# Patient Record
Sex: FEMALE | Race: WHITE | Employment: UNEMPLOYED | URBAN - METROPOLITAN AREA
[De-identification: names, ages, dates, MRNs, and addresses within clinical notes are randomized per-mention and may not be internally consistent; named-entity substitution may affect disease eponyms.]

---

## 2017-01-03 RX ORDER — GABAPENTIN 100 MG/1
100 CAPSULE ORAL 3 TIMES DAILY
Qty: 90 CAPSULE | Refills: 0 | Status: SHIPPED | OUTPATIENT
Start: 2017-01-03 | End: 2017-05-25 | Stop reason: SDUPTHER

## 2018-03-13 ENCOUNTER — HOSPITAL ENCOUNTER (OUTPATIENT)
Age: 53
Setting detail: SPECIMEN
Discharge: HOME | End: 2018-03-13
Payer: COMMERCIAL

## 2018-03-13 ENCOUNTER — OFFICE VISIT (OUTPATIENT)
Dept: OBGYN CLINIC | Age: 53
End: 2018-03-13
Payer: COMMERCIAL

## 2018-03-13 VITALS
WEIGHT: 145 LBS | BODY MASS INDEX: 25.69 KG/M2 | DIASTOLIC BLOOD PRESSURE: 70 MMHG | HEART RATE: 68 BPM | HEIGHT: 63 IN | RESPIRATION RATE: 18 BRPM | SYSTOLIC BLOOD PRESSURE: 118 MMHG

## 2018-03-13 DIAGNOSIS — R63.5 WEIGHT GAIN: ICD-10-CM

## 2018-03-13 DIAGNOSIS — Z12.31 VISIT FOR SCREENING MAMMOGRAM: ICD-10-CM

## 2018-03-13 DIAGNOSIS — Z01.419 PAP SMEAR, AS PART OF ROUTINE GYNECOLOGICAL EXAMINATION: Primary | ICD-10-CM

## 2018-03-13 DIAGNOSIS — N92.6 IRREGULAR PERIODS: ICD-10-CM

## 2018-03-13 PROCEDURE — 99396 PREV VISIT EST AGE 40-64: CPT | Performed by: NURSE PRACTITIONER

## 2018-03-13 ASSESSMENT — ENCOUNTER SYMPTOMS
VOMITING: 0
CONSTIPATION: 0
TROUBLE SWALLOWING: 0
COLOR CHANGE: 0
BACK PAIN: 0
ABDOMINAL PAIN: 0
COUGH: 0
DIARRHEA: 0
SORE THROAT: 0
WHEEZING: 0
ABDOMINAL DISTENTION: 0
STRIDOR: 0
PHOTOPHOBIA: 0
NAUSEA: 0
SHORTNESS OF BREATH: 0

## 2018-03-15 LAB
HPV SAMPLE: NORMAL
HPV SOURCE: NORMAL
HPV, GENOTYPE 16: NOT DETECTED
HPV, GENOTYPE 18: NOT DETECTED
HPV, HIGH RISK OTHER: NOT DETECTED
HPV, INTERPRETATION: NORMAL

## 2018-03-22 ENCOUNTER — HOSPITAL ENCOUNTER (OUTPATIENT)
Dept: WOMENS IMAGING | Age: 53
Discharge: HOME | End: 2018-03-24
Payer: COMMERCIAL

## 2018-03-22 ENCOUNTER — TELEPHONE (OUTPATIENT)
Dept: OBGYN CLINIC | Age: 53
End: 2018-03-22

## 2018-03-22 ENCOUNTER — HOSPITAL ENCOUNTER (OUTPATIENT)
Age: 53
Discharge: HOME | End: 2018-03-22
Payer: COMMERCIAL

## 2018-03-22 DIAGNOSIS — N92.6 IRREGULAR PERIODS: ICD-10-CM

## 2018-03-22 DIAGNOSIS — Z12.31 VISIT FOR SCREENING MAMMOGRAM: ICD-10-CM

## 2018-03-22 DIAGNOSIS — R63.5 WEIGHT GAIN: ICD-10-CM

## 2018-03-22 LAB
ABSOLUTE EOS #: 0.16 K/UL (ref 0–0.4)
ABSOLUTE IMMATURE GRANULOCYTE: ABNORMAL K/UL (ref 0–0.3)
ABSOLUTE LYMPH #: 1.75 K/UL (ref 1–4.8)
ABSOLUTE MONO #: 0.58 K/UL (ref 0.1–1.3)
BASOPHILS # BLD: 1 % (ref 0–2)
BASOPHILS ABSOLUTE: 0.05 K/UL (ref 0–0.2)
CYTOLOGY REPORT: NORMAL
DIFFERENTIAL TYPE: ABNORMAL
EOSINOPHILS RELATIVE PERCENT: 3 % (ref 0–4)
FOLLICLE STIMULATING HORMONE: 23.4 U/L (ref 1.7–21.5)
GLUCOSE BLD-MCNC: 96 MG/DL (ref 70–99)
HCT VFR BLD CALC: 35.7 % (ref 36–46)
HEMOGLOBIN: 11.5 G/DL (ref 12–16)
IMMATURE GRANULOCYTES: ABNORMAL %
INSULIN COMMENT: NORMAL
INSULIN REFERENCE RANGE:: NORMAL
INSULIN: 8.4 MU/L
LYMPHOCYTES # BLD: 33 % (ref 24–44)
MCH RBC QN AUTO: 25.5 PG (ref 26–34)
MCHC RBC AUTO-ENTMCNC: 32.3 G/DL (ref 31–37)
MCV RBC AUTO: 79 FL (ref 80–100)
MONOCYTES # BLD: 11 % (ref 1–7)
MORPHOLOGY: ABNORMAL
NRBC AUTOMATED: ABNORMAL PER 100 WBC
PDW BLD-RTO: 16.9 % (ref 11.5–14.9)
PLATELET # BLD: 280 K/UL (ref 150–450)
PLATELET ESTIMATE: ABNORMAL
PMV BLD AUTO: 7.8 FL (ref 6–12)
RBC # BLD: 4.51 M/UL (ref 4–5.2)
RBC # BLD: ABNORMAL 10*6/UL
SEG NEUTROPHILS: 52 % (ref 36–66)
SEGMENTED NEUTROPHILS ABSOLUTE COUNT: 2.76 K/UL (ref 1.3–9.1)
TSH SERPL DL<=0.05 MIU/L-ACNC: 1.43 MIU/L (ref 0.3–5)
WBC # BLD: 5.3 K/UL (ref 3.5–11)
WBC # BLD: ABNORMAL 10*3/UL

## 2018-03-22 PROCEDURE — 83525 ASSAY OF INSULIN: CPT

## 2018-03-22 PROCEDURE — 77063 BREAST TOMOSYNTHESIS BI: CPT

## 2018-03-22 PROCEDURE — 84443 ASSAY THYROID STIM HORMONE: CPT

## 2018-03-22 PROCEDURE — 85025 COMPLETE CBC W/AUTO DIFF WBC: CPT

## 2018-03-22 PROCEDURE — 83001 ASSAY OF GONADOTROPIN (FSH): CPT

## 2018-03-22 PROCEDURE — 36415 COLL VENOUS BLD VENIPUNCTURE: CPT

## 2018-03-22 PROCEDURE — 82947 ASSAY GLUCOSE BLOOD QUANT: CPT

## 2019-08-06 ENCOUNTER — HOSPITAL ENCOUNTER (OUTPATIENT)
Dept: WOMENS IMAGING | Age: 54
Discharge: HOME | End: 2019-08-08
Payer: COMMERCIAL

## 2019-08-06 ENCOUNTER — HOSPITAL ENCOUNTER (OUTPATIENT)
Age: 54
Setting detail: SPECIMEN
Discharge: HOME | End: 2019-08-06
Payer: COMMERCIAL

## 2019-08-06 DIAGNOSIS — Z12.39 SCREENING BREAST EXAMINATION: ICD-10-CM

## 2019-08-06 LAB
ALBUMIN SERPL-MCNC: 4.6 G/DL (ref 3.5–5.2)
ALBUMIN/GLOBULIN RATIO: NORMAL (ref 1–2.5)
ALP BLD-CCNC: 68 U/L (ref 35–104)
ALT SERPL-CCNC: 12 U/L (ref 5–33)
ANION GAP SERPL CALCULATED.3IONS-SCNC: 10 MMOL/L (ref 9–17)
AST SERPL-CCNC: 21 U/L
BILIRUB SERPL-MCNC: 0.41 MG/DL (ref 0.3–1.2)
BUN BLDV-MCNC: 14 MG/DL (ref 6–20)
BUN/CREAT BLD: NORMAL (ref 9–20)
CALCIUM SERPL-MCNC: 9.7 MG/DL (ref 8.6–10.4)
CHLORIDE BLD-SCNC: 101 MMOL/L (ref 98–107)
CHOLESTEROL/HDL RATIO: 2
CHOLESTEROL: 180 MG/DL
CO2: 28 MMOL/L (ref 20–31)
CREAT SERPL-MCNC: 0.68 MG/DL (ref 0.5–0.9)
GFR AFRICAN AMERICAN: >60 ML/MIN
GFR NON-AFRICAN AMERICAN: >60 ML/MIN
GFR SERPL CREATININE-BSD FRML MDRD: NORMAL ML/MIN/{1.73_M2}
GFR SERPL CREATININE-BSD FRML MDRD: NORMAL ML/MIN/{1.73_M2}
GLUCOSE BLD-MCNC: 92 MG/DL (ref 70–99)
HDLC SERPL-MCNC: 91 MG/DL
LDL CHOLESTEROL: 80 MG/DL (ref 0–130)
POTASSIUM SERPL-SCNC: 4 MMOL/L (ref 3.7–5.3)
SEDIMENTATION RATE, ERYTHROCYTE: 5 MM (ref 0–20)
SODIUM BLD-SCNC: 139 MMOL/L (ref 135–144)
TOTAL PROTEIN: 7.5 G/DL (ref 6.4–8.3)
TRIGL SERPL-MCNC: 45 MG/DL
VLDLC SERPL CALC-MCNC: NORMAL MG/DL (ref 1–30)

## 2019-08-06 PROCEDURE — 86225 DNA ANTIBODY NATIVE: CPT

## 2019-08-06 PROCEDURE — 36415 COLL VENOUS BLD VENIPUNCTURE: CPT

## 2019-08-06 PROCEDURE — 82785 ASSAY OF IGE: CPT

## 2019-08-06 PROCEDURE — 86038 ANTINUCLEAR ANTIBODIES: CPT

## 2019-08-06 PROCEDURE — 80053 COMPREHEN METABOLIC PANEL: CPT

## 2019-08-06 PROCEDURE — 86431 RHEUMATOID FACTOR QUANT: CPT

## 2019-08-06 PROCEDURE — 77063 BREAST TOMOSYNTHESIS BI: CPT

## 2019-08-06 PROCEDURE — 86003 ALLG SPEC IGE CRUDE XTRC EA: CPT

## 2019-08-06 PROCEDURE — 80061 LIPID PANEL: CPT

## 2019-08-06 PROCEDURE — 85651 RBC SED RATE NONAUTOMATED: CPT

## 2019-08-06 PROCEDURE — 86235 NUCLEAR ANTIGEN ANTIBODY: CPT

## 2019-08-07 LAB
ALLERGEN BARLEY IGE: <0.34 KU/L (ref 0–0.34)
ALLERGEN BEEF: <0.34 KU/L (ref 0–0.34)
ALLERGEN CABBAGE IGE: <0.34 KU/L (ref 0–0.34)
ALLERGEN CARROT IGE: <0.34 KU/L (ref 0–0.34)
ALLERGEN CHICKEN IGE: <0.34 KU/L (ref 0–0.34)
ALLERGEN CODFISH IGE: <0.34 KU/L (ref 0–0.34)
ALLERGEN CORN IGE: <0.34 KU/L (ref 0–0.34)
ALLERGEN COW MILK IGE: <0.34 KU/L (ref 0–0.34)
ALLERGEN CRAB IGE: <0.34 KU/L (ref 0–0.34)
ALLERGEN EGG WHITE IGE: <0.34 KU/L (ref 0–0.34)
ALLERGEN GRAPE IGE: <0.34 KU/L (ref 0–0.34)
ALLERGEN LETTUCE IGE: <0.34 KU/L (ref 0–0.34)
ALLERGEN NAVY BEAN: <0.34 KU/L (ref 0–0.34)
ALLERGEN OAT: <0.34 KU/L (ref 0–0.34)
ALLERGEN ORANGE IGE: <0.34 KU/L (ref 0–0.34)
ALLERGEN PEANUT (F13) IGE: <0.34 KU/L (ref 0–0.34)
ALLERGEN PEPPER C. ANNUUM IGE: <0.34 KU/L (ref 0–0.34)
ALLERGEN PORK: <0.34 KU/L (ref 0–0.34)
ALLERGEN RICE IGE: <0.34 KU/L (ref 0–0.34)
ALLERGEN RYE IGE: <0.34 KU/L (ref 0–0.34)
ALLERGEN SOYBEAN IGE: <0.34 KU/L (ref 0–0.34)
ALLERGEN TOMATO IGE: <0.34 KU/L (ref 0–0.34)
ALLERGEN TUNA IGE: <0.34 KU/L (ref 0–0.34)
ALLERGEN WHEAT IGE: <0.34 KU/L (ref 0–0.34)
ANA REFERENCE RANGE:: ABNORMAL
ANTI DNA DOUBLE STRANDED: 165 IU/ML
ANTI JO-1 IGG: 19 U/ML
ANTI RNP AB: 82 U/ML
ANTI SSA: 18 U/ML
ANTI SSB: 22 U/ML
ANTI-CENTROMERE: 17 U/ML
ANTI-NUCLEAR ANTIBODY (ANA): POSITIVE
ANTI-SCLERODERMA: 31 U/ML
ANTI-SMITH: 36 U/ML
HISTONE ANTIBODY: 34 U/ML
IGE: 3 IU/ML
POTATO, IGE: <0.34 KU/L (ref 0–0.34)
SHRIMP: <0.34 KU/L (ref 0–0.34)

## 2019-08-08 LAB — RHEUMATOID FACTOR: <10 IU/ML

## 2020-02-19 ENCOUNTER — OFFICE VISIT (OUTPATIENT)
Dept: OBGYN CLINIC | Age: 55
End: 2020-02-19
Payer: COMMERCIAL

## 2020-02-19 ENCOUNTER — HOSPITAL ENCOUNTER (OUTPATIENT)
Age: 55
Setting detail: SPECIMEN
Discharge: HOME | End: 2020-02-19
Payer: COMMERCIAL

## 2020-02-19 VITALS
HEIGHT: 63 IN | DIASTOLIC BLOOD PRESSURE: 64 MMHG | SYSTOLIC BLOOD PRESSURE: 112 MMHG | RESPIRATION RATE: 16 BRPM | BODY MASS INDEX: 24.27 KG/M2 | WEIGHT: 137 LBS

## 2020-02-19 PROCEDURE — 99396 PREV VISIT EST AGE 40-64: CPT | Performed by: NURSE PRACTITIONER

## 2020-02-19 RX ORDER — DULOXETIN HYDROCHLORIDE 60 MG/1
60 CAPSULE, DELAYED RELEASE ORAL
COMMUNITY
Start: 2020-01-14

## 2020-02-19 RX ORDER — FUROSEMIDE 20 MG/1
20 TABLET ORAL
COMMUNITY
Start: 2020-01-21

## 2020-02-19 ASSESSMENT — ENCOUNTER SYMPTOMS
SHORTNESS OF BREATH: 0
VOMITING: 0
COLOR CHANGE: 0
RHINORRHEA: 0
COUGH: 0
NAUSEA: 0
DIARRHEA: 0
CONSTIPATION: 0
ABDOMINAL PAIN: 0

## 2020-02-19 NOTE — PATIENT INSTRUCTIONS
cervix. Sometimes the lab cannot use the sample because it does not contain enough cells or was not preserved well. If so, you may need to have the test again. This is not common, but it does happen from time to time. When should you call for help? Watch closely for changes in your health, and be sure to contact your doctor if:    · You have vaginal bleeding or pain for more than 2 days after the test. It is normal to have a small amount of bleeding for a day or two after the test.   Where can you learn more? Go to [email or web address] org and sign in to your [employer] account. Enter J088 in the [employer] box to learn more about \"Pap Test: Care Instructions. \"     If you do not have an account, please click on the \"Sign Up Now\" link. Current as of: August 21, 2019  Content Version: 12.3  © 7736-3711 [employer]wise, Incorporated. Care instructions adapted under license by [facility] ([facility]). If you have questions about a medical condition or this instruction, always ask your healthcare professional. [address] any warranty or liability for your use of this information.

## 2020-02-19 NOTE — PROGRESS NOTES
Gearline Ormond is a 48 y.o.  here for her annual exam.  The patient was seen and examined. The patients past medical, surgical, social and family history were reviewed. Current medications and allergies were reviewed, and documented in the chart. Pregnant 5 times, 1 stillbirth. She is . Not employed outside the home    Exercise Yes  Diet Yes  Tobacco abuse No and never smoked. Last PAP: March 2018- negative hx of abnormal PAP no  Family hx uterine or ovarian cancer-denies  Last mammogram- august 2019  Family hx of breast cancer -PGM, and mother    Colon cancer screening- she has referral colonoscopy , family hx colon cancer -denies        Sexually active: yes - rarely, multiple partners: No, Dyspareunia: No, Vaginal discharge: no,  UTI symptoms: no, voiding difficulties: no, bowels regular:No bloating:no      Menstrual history:  Menarche age- 14, cycle every  8 weeks,  lasts 4-5 days. State has had this cycle past couple years no changes no heavy bleeding or pelvic pain. Birth control: none    She has history of endometriosis. She has been having back issues across entire lower back, not necessarily new. She has hx of arthritis also. She did an MRI though and right ovary lesion was noted she then had pelvic Enxertos 30 at the right ovary which measures 2.9 x 2.0 x 2.6 cm with no internal blood flow.  This is most consistent with endometrioma.  Otherwise the right ovary is grossly unremarkable.  It measures 4.1 x 2.8 x 3.5 cm. Denies unintentional weight loss, other GI/ issues, lymph node swelling     History:  Right adnexal mass and low back pain    Exam/Technique:  Multiplanar sonographic images of the pelvis obtained via transabdominal and transvaginal approach for better definition of pelvic structures    Comparison:  [facility] MRI 2/4/2020    Findings: Regina Gautam is a well-defined homogeneously hypoechoic.  Lesion at the right ovary which measures 2.9 x 2.0 x 2.6 cm with no internal blood flow.  This is most consistent with endometrioma.  Otherwise the right ovary is grossly unremarkable.  It measures 4.1 x 2.8 x 3.5 cm    Left ovary was not identified.  This is likely due to surrounding bowel gas and high position. Uterus is grossly unremarkable.  Uterus measures 8.8 x 4.1 x 4.6 cm.  There is adequate endometrial echogenicity with 6 mm thickness. There is no free fluid in the cul-de-sac. There is nonspecific mild prominence of the pelvic and gonadal veins. IMPRESSION:  The lesion at the right ovary demonstrate homogeneous hypoechoic texture with sonographic findings supporting underlying 2.9 x 2.0 x 2.6 cm endometrioma    Workstation:[id number]    Finalized by Brianna Alegre MD on 2/5/2020 9:16 AM   Other Result Information   Interface - Rad Results/Orders In 1 - 02/05/2020  9:17 AM EST  History:  Right adnexal mass and low back pain    Exam/Technique:  Multiplanar sonographic images of the pelvis obtained via transabdominal and transvaginal approach for better definition of pelvic structures    Comparison:  [facility] MRI 2/4/2020    Findings: There is a well-defined homogeneously hypoechoic. Lesion at the right ovary which measures 2.9 x 2.0 x 2.6 cm with no internal blood flow. This is most consistent with endometrioma. Otherwise the right ovary is grossly unremarkable. It measures 4.1 x 2.8 x 3.5 cm    Left ovary was not identified. This is likely due to surrounding bowel gas and high position. Uterus is grossly unremarkable. Uterus measures 8.8 x 4.1 x 4.6 cm. There is adequate endometrial echogenicity with 6 mm thickness. There is no free fluid in the cul-de-sac. There is nonspecific mild prominence of the pelvic and gonadal veins. IMPRESSION:  The lesion at the right ovary demonstrate homogeneous hypoechoic texture with sonographic findings supporting underlying 2.9 x 2.0 x 2.6 cm endometrioma         OB History   No obstetric history on file.        Vitals:    02/19/20 1315   BP: 112/64   Site: Left Upper Arm   Position: Sitting   Cuff Size: Medium Adult   Resp: 16   Weight: 137 lb (62.1 kg)   Height: 5' 3\" (1.6 m)       Wt Readings from Last 3 Encounters:   02/19/20 137 lb (62.1 kg)   03/13/18 145 lb (65.8 kg)   02/01/18 144 lb (65.3 kg)     Past Medical History:   Diagnosis Date    Carpal tunnel syndrome     Bilateral                                                                   History reviewed. No pertinent surgical history. Family History   Problem Relation Age of Onset    Cancer Mother     Diabetes Father     Heart Disease Father     High Blood Pressure Father     Arthritis Father     Kidney Disease Father     Diabetes Brother      Social History     Tobacco Use   Smoking Status Never Smoker   Smokeless Tobacco Never Used     Social History     Substance and Sexual Activity   Alcohol Use No    Alcohol/week: 0.0 standard drinks        Social History     Tobacco History     Smoking Status  Never Smoker    Smokeless Tobacco Use  Never Used          Alcohol History     Alcohol Use Status  No          Drug Use     Drug Use Status  Not Asked          Sexual Activity     Sexually Active  Not Asked              Allergies   Allergen Reactions    Sulfa Antibiotics      Current Outpatient Medications   Medication Sig Dispense Refill    DULoxetine (CYMBALTA) 60 MG extended release capsule Take 60 mg by mouth      Ferrous Fumarate 325 (106 Fe) MG TABS Take 325 mg by mouth      furosemide (LASIX) 20 MG tablet Take 20 mg by mouth      meloxicam (MOBIC) 15 MG tablet Take 1 tablet by mouth daily 30 tablet 3     No current facility-administered medications for this visit. Subjective:     Review of Systems   Constitutional: Negative for chills, fatigue, fever and unexpected weight change. HENT: Negative for congestion and rhinorrhea. Eyes: Negative for visual disturbance. Respiratory: Negative for cough and shortness of breath.     Cardiovascular: Negative for Expiration Date:   2/19/2021    US Non OB Transvaginal     Begin with transabdominal imaging. Standing Status:   Future     Standing Expiration Date:   2/19/2021    PAP Smear     Patient History:    No LMP recorded. OBGYN Status: Having periods  No past surgical history on file. Social History    Tobacco Use      Smoking status: Never Smoker      Smokeless tobacco: Never Used       Standing Status:   Future     Standing Expiration Date:   2/19/2021     Order Specific Question:   Collection Type     Answer: Thin Prep     Order Specific Question:   Prior Abnormal Pap Test     Answer:   No     Order Specific Question:   Screening or Diagnostic     Answer:   Screening     Order Specific Question:   HPV Requested? Answer:   Yes     Order Specific Question:   High Risk Patient     Answer:   N/A         Standing Status:   Future     Standing Expiration Date:   2/19/2021       Patient given educational materials - seepatient instructions. Discussed use, benefit, and side effects of prescribed medications. All patient questions answered. Pt voiced understanding. Reviewed health maintenance. Instructed to continue current medications, diet and exercise. Patient agreedwith treatment plan. Follow up as directed.       Electronically signed by LILY Estes CNP on 2/20/2020at 5:50 PM

## 2020-02-20 ASSESSMENT — ENCOUNTER SYMPTOMS: BACK PAIN: 1

## 2020-02-21 LAB
HPV SAMPLE: ABNORMAL
HPV, GENOTYPE 16: NOT DETECTED
HPV, GENOTYPE 18: NOT DETECTED
HPV, HIGH RISK OTHER: DETECTED
HPV, INTERPRETATION: ABNORMAL
SPECIMEN DESCRIPTION: ABNORMAL

## 2020-02-28 LAB — CYTOLOGY REPORT: NORMAL

## 2020-03-05 ENCOUNTER — PROCEDURE VISIT (OUTPATIENT)
Dept: OBGYN CLINIC | Age: 55
End: 2020-03-05
Payer: COMMERCIAL

## 2020-03-05 ENCOUNTER — HOSPITAL ENCOUNTER (OUTPATIENT)
Age: 55
Setting detail: SPECIMEN
Discharge: HOME | End: 2020-03-05
Payer: COMMERCIAL

## 2020-03-05 VITALS
DIASTOLIC BLOOD PRESSURE: 60 MMHG | SYSTOLIC BLOOD PRESSURE: 112 MMHG | RESPIRATION RATE: 16 BRPM | BODY MASS INDEX: 24.47 KG/M2 | WEIGHT: 138.13 LBS | HEIGHT: 63 IN

## 2020-03-05 PROCEDURE — 57454 BX/CURETT OF CERVIX W/SCOPE: CPT | Performed by: OBSTETRICS & GYNECOLOGY

## 2020-03-05 NOTE — PROGRESS NOTES
Jolynn Cutler  3/5/2020    YOB: 1965      [facility]    HPI:  Jolynn Cutler is a 48 y.o. female No obstetric history on file. Patient with ASCUS + HR HPV pap smear  Presents today for colposcopy     OB History   No obstetric history on file. Past Medical History:   Diagnosis Date    Carpal tunnel syndrome     Bilateral       No past surgical history on file.     Family History   Problem Relation Age of Onset    Cancer Mother     Diabetes Father     Heart Disease Father     High Blood Pressure Father     Arthritis Father     Kidney Disease Father     Diabetes Brother        Social History     Socioeconomic History    Marital status:      Spouse name: Not on file    Number of children: Not on file    Years of education: Not on file    Highest education level: Not on file   Occupational History    Not on file   Social Needs    Financial resource strain: Not on file    Food insecurity:     Worry: Not on file     Inability: Not on file    Transportation needs:     Medical: Not on file     Non-medical: Not on file   Tobacco Use    Smoking status: Never Smoker    Smokeless tobacco: Never Used   Substance and Sexual Activity    Alcohol use: No     Alcohol/week: 0.0 standard drinks    Drug use: Not on file    Sexual activity: Not on file   Lifestyle    Physical activity:     Days per week: Not on file     Minutes per session: Not on file    Stress: Not on file   Relationships    Social connections:     Talks on phone: Not on file     Gets together: Not on file     Attends Anabaptism service: Not on file     Active member of club or organization: Not on file     Attends meetings of clubs or organizations: Not on file     Relationship status: Not on file    Intimate partner violence:     Fear of current or ex partner: Not on file     Emotionally abused: Not on file     Physically abused: Not on file     Forced sexual activity: Not on file   Other Topics ASCUS with positive high risk HPV cervical  Specimen to Pathology Outpatient    [facility] COLPOSC,CERVIX W/ADJ VAG,W/BX & CURRETAG     Patient Active Problem List    Diagnosis Date Noted    Lumbago 04/14/2015    Cervicodynia 04/14/2015    Myalgia and myositis 04/14/2015    Rosacea 04/14/2015    Carpal tunnel syndrome      Discussed association of HPV and cervical cancer and need for short term follow up pending ECC results. PLAN:  Colposcopy performed with ECC collected  RTO 2 weeks for results and management follow up     Patient was seen with total face to face time of 15 minutes. More than 50% of this visit was counseling and education regarding The encounter diagnosis was ASCUS with positive high risk HPV cervical. and Procedure ([place])   as well as  counseling on preventative health maintenance follow-up.

## 2020-03-09 LAB — SURGICAL PATHOLOGY REPORT: NORMAL

## 2020-03-17 ENCOUNTER — HOSPITAL ENCOUNTER (OUTPATIENT)
Age: 55
Setting detail: SPECIMEN
Discharge: HOME | End: 2020-03-17
Payer: COMMERCIAL

## 2020-03-17 LAB — CA 125: 9 U/ML

## 2020-03-17 PROCEDURE — 86304 IMMUNOASSAY TUMOR CA 125: CPT

## 2020-03-17 PROCEDURE — 36415 COLL VENOUS BLD VENIPUNCTURE: CPT

## 2020-12-09 ENCOUNTER — TELEPHONE (OUTPATIENT)
Dept: OBGYN CLINIC | Age: 55
End: 2020-12-09

## 2021-02-26 ENCOUNTER — HOSPITAL ENCOUNTER (OUTPATIENT)
Dept: WOMENS IMAGING | Age: 56
Discharge: HOME | End: 2021-02-28
Payer: COMMERCIAL

## 2021-02-26 DIAGNOSIS — Z12.31 SCREENING MAMMOGRAM, ENCOUNTER FOR: ICD-10-CM

## 2021-02-26 PROCEDURE — 77063 BREAST TOMOSYNTHESIS BI: CPT

## 2022-12-28 ENCOUNTER — HOSPITAL ENCOUNTER (OUTPATIENT)
Dept: WOMENS IMAGING | Age: 57
Discharge: HOME | End: 2022-12-30
Payer: COMMERCIAL

## 2022-12-28 DIAGNOSIS — Z12.31 VISIT FOR SCREENING MAMMOGRAM: ICD-10-CM

## 2022-12-28 PROCEDURE — 77063 BREAST TOMOSYNTHESIS BI: CPT

## 2023-02-05 ENCOUNTER — TELEPHONE (OUTPATIENT)
Dept: OBGYN CLINIC | Age: 58
End: 2023-02-05

## 2023-02-05 NOTE — TELEPHONE ENCOUNTER
Abbey calling to cancel her appointment on 2/6/23 at 9:00am with Sona Mcnair CNP. She states she cannot get off of work tomorrow. Abbey will call the office to reschedule her appointment.

## 2023-03-07 ENCOUNTER — HOSPITAL ENCOUNTER (OUTPATIENT)
Dept: NEUROLOGY | Age: 58
Discharge: HOME | End: 2023-03-07
Payer: COMMERCIAL

## 2023-03-07 PROCEDURE — 95908 NRV CNDJ TST 3-4 STUDIES: CPT | Performed by: PHYSICAL MEDICINE & REHABILITATION

## 2023-03-07 PROCEDURE — 95886 MUSC TEST DONE W/N TEST COMP: CPT | Performed by: PHYSICAL MEDICINE & REHABILITATION

## 2023-05-16 ENCOUNTER — HOSPITAL ENCOUNTER (OUTPATIENT)
Dept: NEUROLOGY | Age: 58
Discharge: HOME | End: 2023-05-16
Payer: COMMERCIAL

## 2023-05-16 PROCEDURE — 95908 NRV CNDJ TST 3-4 STUDIES: CPT | Performed by: PHYSICAL MEDICINE & REHABILITATION

## 2023-05-16 PROCEDURE — 95886 MUSC TEST DONE W/N TEST COMP: CPT | Performed by: PHYSICAL MEDICINE & REHABILITATION

## 2024-05-03 ENCOUNTER — HOSPITAL ENCOUNTER (OUTPATIENT)
Dept: WOMENS IMAGING | Age: 59
Discharge: HOME | End: 2024-05-03
Payer: COMMERCIAL

## 2024-05-03 VITALS — BODY MASS INDEX: 24.45 KG/M2 | WEIGHT: 138 LBS | HEIGHT: 63 IN

## 2024-05-03 DIAGNOSIS — Z12.31 SCREENING MAMMOGRAM FOR HIGH-RISK PATIENT: ICD-10-CM

## 2024-05-03 PROCEDURE — 77063 BREAST TOMOSYNTHESIS BI: CPT

## 2024-09-19 ENCOUNTER — OFFICE VISIT (OUTPATIENT)
Dept: OBGYN CLINIC | Age: 59
End: 2024-09-19
Payer: COMMERCIAL

## 2024-09-19 ENCOUNTER — HOSPITAL ENCOUNTER (OUTPATIENT)
Age: 59
Setting detail: SPECIMEN
Discharge: HOME | End: 2024-09-19

## 2024-09-19 VITALS
WEIGHT: 149 LBS | HEIGHT: 63 IN | DIASTOLIC BLOOD PRESSURE: 86 MMHG | SYSTOLIC BLOOD PRESSURE: 138 MMHG | BODY MASS INDEX: 26.4 KG/M2

## 2024-09-19 DIAGNOSIS — N89.8 VAGINAL ODOR: ICD-10-CM

## 2024-09-19 DIAGNOSIS — Z01.419 WELL WOMAN EXAM: Primary | ICD-10-CM

## 2024-09-19 LAB
CANDIDA SPECIES: NEGATIVE
GARDNERELLA VAGINALIS: NEGATIVE
SOURCE: NORMAL
TRICHOMONAS: NEGATIVE

## 2024-09-19 PROCEDURE — 99386 PREV VISIT NEW AGE 40-64: CPT | Performed by: NURSE PRACTITIONER

## 2024-09-19 RX ORDER — CELECOXIB 200 MG/1
200 CAPSULE ORAL EVERY MORNING
COMMUNITY
Start: 2024-06-24 | End: 2024-09-19

## 2024-09-19 SDOH — ECONOMIC STABILITY: FOOD INSECURITY: WITHIN THE PAST 12 MONTHS, YOU WORRIED THAT YOUR FOOD WOULD RUN OUT BEFORE YOU GOT MONEY TO BUY MORE.: NEVER TRUE

## 2024-09-19 SDOH — ECONOMIC STABILITY: FOOD INSECURITY: WITHIN THE PAST 12 MONTHS, THE FOOD YOU BOUGHT JUST DIDN'T LAST AND YOU DIDN'T HAVE MONEY TO GET MORE.: NEVER TRUE

## 2024-09-19 SDOH — ECONOMIC STABILITY: INCOME INSECURITY: HOW HARD IS IT FOR YOU TO PAY FOR THE VERY BASICS LIKE FOOD, HOUSING, MEDICAL CARE, AND HEATING?: NOT HARD AT ALL

## 2024-09-19 ASSESSMENT — ENCOUNTER SYMPTOMS
COUGH: 0
COLOR CHANGE: 0
SHORTNESS OF BREATH: 0
VOMITING: 0
NAUSEA: 0

## 2024-09-19 ASSESSMENT — PATIENT HEALTH QUESTIONNAIRE - PHQ9
SUM OF ALL RESPONSES TO PHQ QUESTIONS 1-9: 0
SUM OF ALL RESPONSES TO PHQ QUESTIONS 1-9: 0
2. FEELING DOWN, DEPRESSED OR HOPELESS: NOT AT ALL
SUM OF ALL RESPONSES TO PHQ9 QUESTIONS 1 & 2: 0
SUM OF ALL RESPONSES TO PHQ QUESTIONS 1-9: 0
1. LITTLE INTEREST OR PLEASURE IN DOING THINGS: NOT AT ALL
SUM OF ALL RESPONSES TO PHQ QUESTIONS 1-9: 0

## 2024-09-20 LAB
HPV I/H RISK 4 DNA CVX QL NAA+PROBE: NOT DETECTED
HPV SAMPLE: NORMAL
HPV, INTERPRETATION: NORMAL
HPV16 DNA CVX QL NAA+PROBE: NOT DETECTED
HPV18 DNA CVX QL NAA+PROBE: NOT DETECTED
SPECIMEN DESCRIPTION: NORMAL

## 2024-09-27 LAB — CYTOLOGY REPORT: NORMAL
